# Patient Record
Sex: MALE | Employment: PART TIME | URBAN - METROPOLITAN AREA
[De-identification: names, ages, dates, MRNs, and addresses within clinical notes are randomized per-mention and may not be internally consistent; named-entity substitution may affect disease eponyms.]

---

## 2018-11-19 ENCOUNTER — APPOINTMENT (OUTPATIENT)
Dept: GENERAL RADIOLOGY | Age: 23
End: 2018-11-19
Payer: MEDICARE

## 2018-11-19 ENCOUNTER — HOSPITAL ENCOUNTER (EMERGENCY)
Age: 23
Discharge: HOME | End: 2018-11-19
Attending: EMERGENCY MEDICINE
Payer: MEDICARE

## 2018-11-19 VITALS
SYSTOLIC BLOOD PRESSURE: 125 MMHG | TEMPERATURE: 98 F | OXYGEN SATURATION: 100 % | HEIGHT: 75 IN | RESPIRATION RATE: 18 BRPM | DIASTOLIC BLOOD PRESSURE: 63 MMHG | BODY MASS INDEX: 21.76 KG/M2 | WEIGHT: 175 LBS | HEART RATE: 56 BPM

## 2018-11-19 DIAGNOSIS — S20.211A CONTUSION OF RIGHT CHEST WALL, INITIAL ENCOUNTER: Primary | ICD-10-CM

## 2018-11-19 PROCEDURE — 99283 EMERGENCY DEPT VISIT LOW MDM: CPT

## 2018-11-19 PROCEDURE — 71046 X-RAY EXAM CHEST 2 VIEWS: CPT

## 2018-11-19 RX ORDER — IBUPROFEN 600 MG/1
600 TABLET ORAL EVERY 6 HOURS PRN
Qty: 20 TABLET | Refills: 0 | Status: SHIPPED | OUTPATIENT
Start: 2018-11-19 | End: 2019-01-10

## 2018-11-19 ASSESSMENT — PAIN DESCRIPTION - LOCATION: LOCATION: ARM;SHOULDER

## 2018-11-19 ASSESSMENT — PAIN DESCRIPTION - ORIENTATION: ORIENTATION: RIGHT;UPPER

## 2018-11-19 ASSESSMENT — PAIN DESCRIPTION - DESCRIPTORS: DESCRIPTORS: ACHING;SHARP;SHOOTING

## 2018-11-19 ASSESSMENT — PAIN DESCRIPTION - PAIN TYPE: TYPE: ACUTE PAIN

## 2018-11-19 ASSESSMENT — PAIN SCALES - GENERAL: PAINLEVEL_OUTOF10: 7

## 2018-11-19 NOTE — ED PROVIDER NOTES
The patient was seen and examined by me in conjunction with the mid-level provider. I agree with his/her assessment and treatment plan. The patient has no tenderness at his right shoulder and it has full range of motion. He has tenderness in the right upper chest but x-rays negative per radiologist.  My clinical impression is that he has a contusion.      Damon Martinez MD  11/19/18 9599
4, 8 or more for level 5)     ED Triage Vitals [11/19/18 1407]   BP Temp Temp Source Pulse Resp SpO2 Height Weight   125/63 98 °F (36.7 °C) Oral 56 18 100 % 6' 3\" (1.905 m) 175 lb (79.4 kg)       Physical Exam   Constitutional: He is oriented to person, place, and time. He appears well-developed and well-nourished. No distress. HENT:   Head: Normocephalic and atraumatic. Eyes: Conjunctivae are normal. Right eye exhibits no discharge. Left eye exhibits no discharge. Neck: Normal range of motion. Neck supple. No spinous process tenderness and no muscular tenderness present. Cardiovascular: Normal rate and regular rhythm. Pulmonary/Chest: Effort normal and breath sounds normal. No respiratory distress. He exhibits tenderness. Musculoskeletal: Normal range of motion. He exhibits no edema, tenderness or deformity. Lymphadenopathy:     He has no cervical adenopathy. Neurological: He is oriented to person, place, and time. Skin: Skin is warm and dry. No erythema. DIAGNOSTIC RESULTS     EKG: All EKG's are interpreted by the Emergency Department Physician who either signs or Co-signs this chart in the absence of a cardiologist.    RADIOLOGY:   Non-plain film images such as CT, Ultrasound and MRI are read by the radiologist. Plain radiographic images are visualized and preliminarily interpreted by the emergency physician with the below findings:    Interpretation per the Radiologist below, if available at the time of this note:    XR CHEST STANDARD (2 VW)   Final Result   Unremarkable chest.                 LABS:  Labs Reviewed - No data to display    All other labs were within normal range or not returned as of this dictation.     EMERGENCY DEPARTMENT COURSE and DIFFERENTIAL DIAGNOSIS/MDM:   Vitals:    Vitals:    11/19/18 1407   BP: 125/63   Pulse: 56   Resp: 18   Temp: 98 °F (36.7 °C)   TempSrc: Oral   SpO2: 100%   Weight: 175 lb (79.4 kg)   Height: 6' 3\" (1.905 m)       Medical Decision

## 2019-01-10 ENCOUNTER — HOSPITAL ENCOUNTER (EMERGENCY)
Age: 24
Discharge: HOME | End: 2019-01-10
Attending: EMERGENCY MEDICINE
Payer: MEDICARE

## 2019-01-10 ENCOUNTER — APPOINTMENT (OUTPATIENT)
Dept: GENERAL RADIOLOGY | Age: 24
End: 2019-01-10
Payer: MEDICARE

## 2019-01-10 VITALS
TEMPERATURE: 98.2 F | WEIGHT: 187.39 LBS | BODY MASS INDEX: 23.3 KG/M2 | RESPIRATION RATE: 16 BRPM | DIASTOLIC BLOOD PRESSURE: 52 MMHG | HEIGHT: 75 IN | OXYGEN SATURATION: 100 % | HEART RATE: 67 BPM | SYSTOLIC BLOOD PRESSURE: 119 MMHG

## 2019-01-10 DIAGNOSIS — S82.892A CLOSED FRACTURE OF LEFT ANKLE, INITIAL ENCOUNTER: Primary | ICD-10-CM

## 2019-01-10 PROCEDURE — 73610 X-RAY EXAM OF ANKLE: CPT

## 2019-01-10 PROCEDURE — 99283 EMERGENCY DEPT VISIT LOW MDM: CPT

## 2019-01-10 PROCEDURE — 29515 APPLICATION SHORT LEG SPLINT: CPT

## 2019-01-10 PROCEDURE — 6370000000 HC RX 637 (ALT 250 FOR IP): Performed by: EMERGENCY MEDICINE

## 2019-01-10 RX ORDER — IBUPROFEN 600 MG/1
600 TABLET ORAL ONCE
Status: COMPLETED | OUTPATIENT
Start: 2019-01-10 | End: 2019-01-10

## 2019-01-10 RX ORDER — IBUPROFEN 400 MG/1
400 TABLET ORAL EVERY 8 HOURS PRN
Status: DISCONTINUED | OUTPATIENT
Start: 2019-01-10 | End: 2019-01-10 | Stop reason: HOSPADM

## 2019-01-10 RX ORDER — IBUPROFEN 600 MG/1
600 TABLET ORAL EVERY 6 HOURS PRN
Qty: 20 TABLET | Refills: 0 | Status: SHIPPED | OUTPATIENT
Start: 2019-01-10 | End: 2020-01-16

## 2019-01-10 RX ORDER — ACETAMINOPHEN 325 MG/1
650 TABLET ORAL ONCE
Status: COMPLETED | OUTPATIENT
Start: 2019-01-10 | End: 2019-01-10

## 2019-01-10 RX ADMIN — ACETAMINOPHEN 650 MG: 325 TABLET ORAL at 14:59

## 2019-01-10 RX ADMIN — IBUPROFEN 600 MG: 600 TABLET ORAL at 14:59

## 2019-01-10 ASSESSMENT — PAIN DESCRIPTION - ORIENTATION: ORIENTATION: LEFT

## 2019-01-10 ASSESSMENT — PAIN DESCRIPTION - DESCRIPTORS: DESCRIPTORS: ACHING;SHARP;STABBING

## 2019-01-10 ASSESSMENT — PAIN SCALES - GENERAL
PAINLEVEL_OUTOF10: 10
PAINLEVEL_OUTOF10: 9

## 2019-01-10 ASSESSMENT — PAIN DESCRIPTION - LOCATION: LOCATION: ANKLE

## 2019-01-22 DIAGNOSIS — M25.572 LEFT ANKLE PAIN, UNSPECIFIED CHRONICITY: Primary | ICD-10-CM

## 2019-01-23 ENCOUNTER — OFFICE VISIT (OUTPATIENT)
Dept: ORTHOPEDIC SURGERY | Age: 24
End: 2019-01-23
Payer: MEDICARE

## 2019-01-23 DIAGNOSIS — S99.912A INJURY OF LEFT ANKLE, INITIAL ENCOUNTER: Primary | ICD-10-CM

## 2019-01-23 PROCEDURE — G8484 FLU IMMUNIZE NO ADMIN: HCPCS | Performed by: STUDENT IN AN ORGANIZED HEALTH CARE EDUCATION/TRAINING PROGRAM

## 2019-01-23 PROCEDURE — 99203 OFFICE O/P NEW LOW 30 MIN: CPT | Performed by: STUDENT IN AN ORGANIZED HEALTH CARE EDUCATION/TRAINING PROGRAM

## 2019-01-23 PROCEDURE — G8420 CALC BMI NORM PARAMETERS: HCPCS | Performed by: STUDENT IN AN ORGANIZED HEALTH CARE EDUCATION/TRAINING PROGRAM

## 2019-01-23 PROCEDURE — 1036F TOBACCO NON-USER: CPT | Performed by: STUDENT IN AN ORGANIZED HEALTH CARE EDUCATION/TRAINING PROGRAM

## 2019-01-23 PROCEDURE — G8427 DOCREV CUR MEDS BY ELIG CLIN: HCPCS | Performed by: STUDENT IN AN ORGANIZED HEALTH CARE EDUCATION/TRAINING PROGRAM

## 2019-01-28 ENCOUNTER — HOSPITAL ENCOUNTER (OUTPATIENT)
Dept: PHYSICAL THERAPY | Facility: CLINIC | Age: 24
Setting detail: THERAPIES SERIES
Discharge: HOME | End: 2019-01-28
Payer: MEDICARE

## 2019-01-28 PROCEDURE — 97110 THERAPEUTIC EXERCISES: CPT

## 2019-01-28 PROCEDURE — 97161 PT EVAL LOW COMPLEX 20 MIN: CPT

## 2019-01-31 ENCOUNTER — HOSPITAL ENCOUNTER (OUTPATIENT)
Dept: PHYSICAL THERAPY | Facility: CLINIC | Age: 24
Setting detail: THERAPIES SERIES
Discharge: HOME | End: 2019-01-31
Payer: MEDICARE

## 2019-01-31 PROCEDURE — 97110 THERAPEUTIC EXERCISES: CPT

## 2019-02-04 ENCOUNTER — HOSPITAL ENCOUNTER (OUTPATIENT)
Dept: PHYSICAL THERAPY | Facility: CLINIC | Age: 24
Setting detail: THERAPIES SERIES
Discharge: HOME | End: 2019-02-04
Payer: MEDICARE

## 2019-02-04 PROCEDURE — 97110 THERAPEUTIC EXERCISES: CPT

## 2019-02-07 ENCOUNTER — HOSPITAL ENCOUNTER (OUTPATIENT)
Dept: PHYSICAL THERAPY | Facility: CLINIC | Age: 24
Setting detail: THERAPIES SERIES
Discharge: HOME | End: 2019-02-07
Payer: MEDICARE

## 2019-02-07 PROCEDURE — 97110 THERAPEUTIC EXERCISES: CPT

## 2019-02-27 ENCOUNTER — HOSPITAL ENCOUNTER (OUTPATIENT)
Dept: PHYSICAL THERAPY | Facility: CLINIC | Age: 24
Setting detail: THERAPIES SERIES
Discharge: HOME | End: 2019-02-27
Payer: MEDICARE

## 2019-02-27 PROCEDURE — 97110 THERAPEUTIC EXERCISES: CPT

## 2019-03-06 ENCOUNTER — OFFICE VISIT (OUTPATIENT)
Dept: ORTHOPEDIC SURGERY | Age: 24
End: 2019-03-06
Payer: MEDICARE

## 2019-03-06 VITALS — HEIGHT: 75 IN | WEIGHT: 187.39 LBS | BODY MASS INDEX: 23.3 KG/M2

## 2019-03-06 DIAGNOSIS — S99.912A INJURY OF LEFT ANKLE, INITIAL ENCOUNTER: Primary | ICD-10-CM

## 2019-03-06 PROCEDURE — G8420 CALC BMI NORM PARAMETERS: HCPCS | Performed by: STUDENT IN AN ORGANIZED HEALTH CARE EDUCATION/TRAINING PROGRAM

## 2019-03-06 PROCEDURE — 99213 OFFICE O/P EST LOW 20 MIN: CPT | Performed by: STUDENT IN AN ORGANIZED HEALTH CARE EDUCATION/TRAINING PROGRAM

## 2019-03-06 PROCEDURE — G8427 DOCREV CUR MEDS BY ELIG CLIN: HCPCS | Performed by: STUDENT IN AN ORGANIZED HEALTH CARE EDUCATION/TRAINING PROGRAM

## 2019-03-06 PROCEDURE — 1036F TOBACCO NON-USER: CPT | Performed by: STUDENT IN AN ORGANIZED HEALTH CARE EDUCATION/TRAINING PROGRAM

## 2019-03-06 PROCEDURE — G8484 FLU IMMUNIZE NO ADMIN: HCPCS | Performed by: STUDENT IN AN ORGANIZED HEALTH CARE EDUCATION/TRAINING PROGRAM

## 2019-03-07 ENCOUNTER — HOSPITAL ENCOUNTER (OUTPATIENT)
Dept: PHYSICAL THERAPY | Facility: CLINIC | Age: 24
Setting detail: THERAPIES SERIES
Discharge: HOME | End: 2019-03-07
Payer: MEDICARE

## 2019-03-07 PROCEDURE — 97110 THERAPEUTIC EXERCISES: CPT

## 2020-01-16 ENCOUNTER — HOSPITAL ENCOUNTER (EMERGENCY)
Age: 25
Discharge: HOME | End: 2020-01-16
Attending: EMERGENCY MEDICINE
Payer: MEDICARE

## 2020-01-16 ENCOUNTER — APPOINTMENT (OUTPATIENT)
Dept: GENERAL RADIOLOGY | Age: 25
End: 2020-01-16
Payer: MEDICARE

## 2020-01-16 VITALS
HEIGHT: 74 IN | SYSTOLIC BLOOD PRESSURE: 116 MMHG | DIASTOLIC BLOOD PRESSURE: 91 MMHG | TEMPERATURE: 98.2 F | OXYGEN SATURATION: 100 % | WEIGHT: 181 LBS | BODY MASS INDEX: 23.23 KG/M2 | RESPIRATION RATE: 14 BRPM | HEART RATE: 57 BPM

## 2020-01-16 PROCEDURE — 99283 EMERGENCY DEPT VISIT LOW MDM: CPT

## 2020-01-16 PROCEDURE — 73630 X-RAY EXAM OF FOOT: CPT

## 2020-01-16 ASSESSMENT — PAIN DESCRIPTION - PAIN TYPE: TYPE: ACUTE PAIN

## 2020-01-16 ASSESSMENT — ENCOUNTER SYMPTOMS
NAUSEA: 0
COLOR CHANGE: 0
SHORTNESS OF BREATH: 0
VOMITING: 0

## 2020-01-16 ASSESSMENT — PAIN SCALES - GENERAL
PAINLEVEL_OUTOF10: 5
PAINLEVEL_OUTOF10: 5

## 2020-01-16 ASSESSMENT — PAIN DESCRIPTION - ORIENTATION: ORIENTATION: RIGHT;UPPER

## 2020-01-16 ASSESSMENT — PAIN DESCRIPTION - LOCATION: LOCATION: FOOT

## 2020-01-16 ASSESSMENT — PAIN DESCRIPTION - DESCRIPTORS: DESCRIPTORS: SORE

## 2020-01-16 NOTE — ED NOTES
ASSESSMENT:   Presents to ED per self with c/o pain to dorsum lt foot. Proximal 1st metatarsal.  States couple weeks ago was playing soccer when another player jumped up and landed on his foot. Had cleats on. Pain and swelling still present. Taking motrin which helps.   Pain worse with walking     Conrad Xiong RN  01/16/20 6910

## 2020-01-16 NOTE — ED PROVIDER NOTES
[address] [place] [address] ED  eMERGENCY dEPARTMENT eNCOUnter  Resident    Pt Name: Aydee Man  MRN: [id number]  [place] 1995  Date of evaluation: 1/16/2020  PCP:  No primary care provider on file. CHIEF COMPLAINT       Chief Complaint   Patient presents with    Foot Injury     right foot, 3 weeks ago       HISTORY OF PRESENT ILLNESS    Aydee Man is a 22 y.o. male who presents with right foot pain. Notes that he was playing soccer 2 weeks ago where another player stepped on his foot. Notes initially pain was much worsened today. Swelling in his right foot had improved since initial injury. Notes that he has been applying ice and taking Motrin for pain which is helped. Patient is still experiencing pain and some swelling to his right foot which prompted ED visit. REVIEW OF SYSTEMS       Review of Systems   Constitutional: Negative for chills and fever. Respiratory: Negative for shortness of breath. Cardiovascular: Negative for chest pain and leg swelling. Gastrointestinal: Negative for nausea and vomiting. Musculoskeletal: Positive for arthralgias, gait problem, joint swelling and myalgias. Skin: Negative for color change and wound. Neurological: Negative for weakness and numbness. Psychiatric/Behavioral: Negative for behavioral problems. PAST MEDICAL HISTORY    has no past medical history on file. SURGICAL HISTORY      has no past surgical history on file. CURRENT MEDICATIONS       Previous Medications    IBUPROFEN (ADVIL;MOTRIN) 600 MG TABLET    Take 1 tablet by mouth every 6 hours as needed for Pain       ALLERGIES     has No Known Allergies. FAMILY HISTORY     has no family status information on file. family history is not on file. SOCIAL HISTORY      reports that he has never smoked. He has never used smokeless tobacco. He reports that he does not drink alcohol or use drugs.     PHYSICAL EXAM     INITIAL VITALS:  height is 6' 2\" (1.88 m) and weight is 181 lb (82.1 kg). His oral temperature is 98.2 °F (36.8 °C). His blood pressure is 116/91 (abnormal) and his pulse is 57. His respiration is 14 and oxygen saturation is 100%. Physical Exam  Constitutional:       Appearance: Normal appearance. He is normal weight. HENT:      Head: Normocephalic and atraumatic. Neck:      Musculoskeletal: Normal range of motion and neck supple. Cardiovascular:      Rate and Rhythm: Regular rhythm. Bradycardia present. Pulses: Normal pulses. Heart sounds: Normal heart sounds. Pulmonary:      Effort: Pulmonary effort is normal.      Breath sounds: Normal breath sounds. No wheezing or rales. Abdominal:      General: Bowel sounds are normal.      Palpations: Abdomen is soft. Tenderness: There is no tenderness. There is no rebound. Musculoskeletal: Normal range of motion. General: Swelling and tenderness present. No deformity or signs of injury. Right lower leg: Edema present. Comments: Minimal TTP to first TMTJ dorsal right foot. Able to flex and extend digits. All muscle compartments soft compressible. No tenderness to palpation at Lisfranc complex right foot. No pain on calf squeeze bilaterally. Skin:     General: Skin is warm and dry. Capillary Refill: Capillary refill takes 2 to 3 seconds. Findings: No bruising, erythema or lesion. Comments: No fracture blisters, ecchymosis, open wounds noted. No erythema present. No tenting of the skin noted. Neurological:      Mental Status: He is alert and oriented to person, place, and time. Mental status is at baseline. Sensory: No sensory deficit.       Coordination: Coordination normal.         DIFFERENTIAL DIAGNOSIS/MDM:   Right foot bone contusion versus fracture    DIAGNOSTIC RESULTS     EKG: All EKG's are interpreted by the Emergency Department Physician who either signs or Co-signs this chart in the absence of a cardiologist.    None    RADIOLOGY:   I directly visualized the following  images and reviewed the radiologist interpretations:  XR FOOT RIGHT (MIN 3 VIEWS)   Final Result   Negative right foot with no acute osseous abnormality. ED BEDSIDE ULTRASOUND:   None    LABS:  Labs Reviewed - No data to display    EMERGENCY DEPARTMENT COURSE:   Vitals:    Vitals:    01/16/20 1014   BP: (!) 116/91   Pulse: 57   Resp: 14   Temp: 98.2 °F (36.8 °C)   TempSrc: Oral   SpO2: 100%   Weight: 181 lb (82.1 kg)   Height: 6' 2\" (1.88 m)     XR Right foot ordered. X-rays negative for any osseous abnormalities or dislocations. CRITICAL CARE:  None    CONSULTS:  None      PROCEDURES:  None      FINAL IMPRESSION      1. Contusion of right foot, initial encounter            DISPOSITION/PLAN   DISPOSITION Decision To Discharge 01/16/2020 10:52:14 AM      Ace wrap applied and surgical shoe dispensed right foot. Recommend rest ice and elevating right foot. Ibuprofen for pain. Information to obtain PCP given. Patient to follow-up with Dr. Elizabeth Spivey within 1 week for follow-up.     PATIENT REFERRED TO:  Iris Vinson [facility].  [phone]    In 1 week        DISCHARGE MEDICATIONS:  New Prescriptions    No medications on file       (Please note that portions of this note were completed with a voice recognition program.  Efforts were made to edit the dictations but occasionally words are mis-transcribed.)    Roberta Roque DPM   1/16/2020 at 11:09 AM       Roberta Roque DPM  01/16/20 1110

## 2021-07-01 ENCOUNTER — HOSPITAL ENCOUNTER (OUTPATIENT)
Age: 26
Setting detail: SPECIMEN
Discharge: HOME | End: 2021-07-01

## 2021-07-02 LAB
C. TRACHOMATIS DNA ,URINE: NEGATIVE
N. GONORRHOEAE DNA, URINE: NEGATIVE
SOURCE: NORMAL
SPECIMEN DESCRIPTION: NORMAL
TRICHOMONAS VAGINALI, MOLECULAR: NEGATIVE

## 2021-07-20 ENCOUNTER — HOSPITAL ENCOUNTER (EMERGENCY)
Age: 26
Discharge: HOME | End: 2021-07-20
Attending: EMERGENCY MEDICINE
Payer: MEDICARE

## 2021-07-20 VITALS
SYSTOLIC BLOOD PRESSURE: 110 MMHG | DIASTOLIC BLOOD PRESSURE: 57 MMHG | WEIGHT: 186.5 LBS | BODY MASS INDEX: 23.93 KG/M2 | RESPIRATION RATE: 16 BRPM | OXYGEN SATURATION: 99 % | TEMPERATURE: 98.3 F | HEIGHT: 74 IN | HEART RATE: 62 BPM

## 2021-07-20 DIAGNOSIS — Z20.2 EXPOSURE TO STD: Primary | ICD-10-CM

## 2021-07-20 LAB
BILIRUBIN URINE: NEGATIVE
COLOR: YELLOW
COMMENT UA: NORMAL
GLUCOSE URINE: NEGATIVE
KETONES, URINE: NEGATIVE
LEUKOCYTE ESTERASE, URINE: NEGATIVE
NITRITE, URINE: NEGATIVE
PH UA: 7.5 (ref 5–8)
PROTEIN UA: NEGATIVE
SPECIFIC GRAVITY UA: 1.01 (ref 1–1.03)
TURBIDITY: CLEAR
URINE HGB: NEGATIVE
UROBILINOGEN, URINE: NORMAL

## 2021-07-20 PROCEDURE — 6370000000 HC RX 637 (ALT 250 FOR IP): Performed by: EMERGENCY MEDICINE

## 2021-07-20 PROCEDURE — 6360000002 HC RX W HCPCS: Performed by: EMERGENCY MEDICINE

## 2021-07-20 PROCEDURE — 99283 EMERGENCY DEPT VISIT LOW MDM: CPT

## 2021-07-20 PROCEDURE — 87491 CHLMYD TRACH DNA AMP PROBE: CPT

## 2021-07-20 PROCEDURE — 96372 THER/PROPH/DIAG INJ SC/IM: CPT

## 2021-07-20 PROCEDURE — 87591 N.GONORRHOEAE DNA AMP PROB: CPT

## 2021-07-20 PROCEDURE — 81003 URINALYSIS AUTO W/O SCOPE: CPT

## 2021-07-20 RX ORDER — AZITHROMYCIN 250 MG/1
1000 TABLET, FILM COATED ORAL ONCE
Status: COMPLETED | OUTPATIENT
Start: 2021-07-20 | End: 2021-07-20

## 2021-07-20 RX ORDER — CEFTRIAXONE SODIUM 250 MG/1
250 INJECTION, POWDER, FOR SOLUTION INTRAMUSCULAR; INTRAVENOUS ONCE
Status: COMPLETED | OUTPATIENT
Start: 2021-07-20 | End: 2021-07-20

## 2021-07-20 RX ORDER — METRONIDAZOLE 500 MG/1
2000 TABLET ORAL ONCE
Status: COMPLETED | OUTPATIENT
Start: 2021-07-20 | End: 2021-07-20

## 2021-07-20 RX ADMIN — METRONIDAZOLE 2000 MG: 500 TABLET ORAL at 18:13

## 2021-07-20 RX ADMIN — AZITHROMYCIN MONOHYDRATE 1000 MG: 250 TABLET ORAL at 18:14

## 2021-07-20 RX ADMIN — CEFTRIAXONE SODIUM 250 MG: 250 INJECTION, POWDER, FOR SOLUTION INTRAMUSCULAR; INTRAVENOUS at 18:14

## 2021-07-20 NOTE — ED PROVIDER NOTES
EMERGENCY DEPARTMENT ENCOUNTER    Pt Name: Nisa Murray  MRN: [id number]  [place] 1995  Date of evaluation: 7/20/21  CHIEF COMPLAINT       Chief Complaint   Patient presents with    Exposure to STD     partner is having discharge     HISTORY OF PRESENT ILLNESS   This is a 60-year-old male that presents with complaints of exposure to an STD. Patient denies any symptoms, he denies any discharge, has no dysuria hematuria testicular pain or swelling. He states that his partner was diagnosed with trichomonas. REVIEW OF SYSTEMS     Review of Systems   Genitourinary: Negative for discharge, penile pain, scrotal swelling and testicular pain. All other systems reviewed and are negative. PASTMEDICAL HISTORY   History reviewed. No pertinent past medical history. Past Problem List  There is no problem list on file for this patient. SURGICAL HISTORY     History reviewed. No pertinent surgical history. CURRENT MEDICATIONS       Current Discharge Medication List      CONTINUE these medications which have NOT CHANGED    Details   ibuprofen (ADVIL;MOTRIN) 600 MG tablet Take 1 tablet by mouth every 6 hours as needed for Pain  Qty: 20 tablet, Refills: 0           ALLERGIES     has No Known Allergies. FAMILY HISTORY     has no family status information on file. SOCIAL HISTORY       Social History     Tobacco Use    Smoking status: Never Smoker    Smokeless tobacco: Never Used   Substance Use Topics    Alcohol use: No    Drug use: No     PHYSICAL EXAM     INITIAL VITALS: BP (!) 110/57   Pulse 62   Temp 98.3 °F (36.8 °C) (Oral)   Resp 16   Ht 6' 2\" (1.88 m)   Wt 186 lb 8 oz (84.6 kg)   SpO2 99%   BMI 23.95 kg/m²    Physical Exam  Constitutional:       Appearance: Normal appearance. HENT:      Head: Normocephalic and atraumatic. Eyes:      Extraocular Movements: Extraocular movements intact. Pupils: Pupils are equal, round, and reactive to light.    Cardiovascular:      Rate and Rhythm: Normal rate and regular rhythm. Pulmonary:      Effort: Pulmonary effort is normal.      Breath sounds: Normal breath sounds. Abdominal:      General: Abdomen is flat. Palpations: Abdomen is soft. Tenderness: There is no abdominal tenderness. Neurological:      Mental Status: He is alert. MEDICAL DECISION MAKIN-year-old male, exposure to trichomonas, presumptive treatment for STDs and outpatient follow-up. CRITICAL CARE:       PROCEDURES:    Procedures    DIAGNOSTIC RESULTS   EKG:All EKG's are interpreted by the Emergency Department Physician who either signs or Co-signs this chart in the absence of a cardiologist.        RADIOLOGY:All plain film, CT, MRI, and formal ultrasound images (except ED bedside ultrasound) are read by the radiologist, see reports below, unless otherwisenoted in MDM or here. No orders to display     LABS: All lab results were reviewed by myself, and all abnormals are listed below. Labs Reviewed   C.TRACHOMATIS N.GONORRHOEAE DNA, URINE   URINALYSIS       EMERGENCY DEPARTMENTCOURSE:         Vitals:    Vitals:    21 1736   BP: (!) 110/57   Pulse: 62   Resp: 16   Temp: 98.3 °F (36.8 °C)   TempSrc: Oral   SpO2: 99%   Weight: 186 lb 8 oz (84.6 kg)   Height: 6' 2\" (1.88 m)       The patient was given the following medications while in the emergency department:  Orders Placed This Encounter   Medications    cefTRIAXone (ROCEPHIN) injection 250 mg     Order Specific Question:   Antimicrobial Indications     Answer:   STD infection    azithromycin (ZITHROMAX) tablet 1,000 mg     Order Specific Question:   Antimicrobial Indications     Answer:   STD infection    metroNIDAZOLE (FLAGYL) tablet 2,000 mg     Order Specific Question:   Antimicrobial Indications     Answer:   STD infection     CONSULTS:  None    FINAL IMPRESSION      1.  Exposure to STD          DISPOSITION/PLAN   DISPOSITION Decision To Discharge 2021 06:06:27 PM      PATIENT REFERRED TO:  [place] [id number] [phone]  Schedule an appointment as soon as possible for a visit in 2 days      DISCHARGE MEDICATIONS:  Current Discharge Medication List        Kaya Forde MD  Attending Emergency Physician                   Kaya Forde MD  07/20/21 0170

## 2021-07-21 LAB
C. TRACHOMATIS DNA ,URINE: NEGATIVE
N. GONORRHOEAE DNA, URINE: NEGATIVE
SPECIMEN DESCRIPTION: NORMAL

## 2021-07-22 ENCOUNTER — HOSPITAL ENCOUNTER (EMERGENCY)
Age: 26
Discharge: HOME | End: 2021-07-22
Attending: EMERGENCY MEDICINE
Payer: MEDICARE

## 2021-07-22 VITALS
HEIGHT: 74 IN | TEMPERATURE: 98.2 F | OXYGEN SATURATION: 100 % | BODY MASS INDEX: 24 KG/M2 | HEART RATE: 55 BPM | WEIGHT: 187 LBS | SYSTOLIC BLOOD PRESSURE: 107 MMHG | RESPIRATION RATE: 16 BRPM | DIASTOLIC BLOOD PRESSURE: 43 MMHG

## 2021-07-22 DIAGNOSIS — Z76.89 ENCOUNTER FOR ASSESSMENT OF STD EXPOSURE: Primary | ICD-10-CM

## 2021-07-22 LAB
-: NORMAL
AMORPHOUS: NORMAL
BACTERIA: NORMAL
BILIRUBIN URINE: NEGATIVE
CASTS UA: NORMAL /LPF
COLOR: YELLOW
COMMENT UA: NORMAL
CRYSTALS, UA: NORMAL /HPF
EPITHELIAL CELLS UA: NORMAL /HPF (ref 0–5)
GLUCOSE URINE: NEGATIVE
KETONES, URINE: NEGATIVE
LEUKOCYTE ESTERASE, URINE: NEGATIVE
MUCUS: NORMAL
NITRITE, URINE: NEGATIVE
OTHER OBSERVATIONS UA: NORMAL
PH UA: 6 (ref 5–8)
PROTEIN UA: NEGATIVE
RBC UA: NORMAL /HPF (ref 0–2)
RENAL EPITHELIAL, UA: NORMAL /HPF
SPECIFIC GRAVITY UA: 1.02 (ref 1–1.03)
TRICHOMONAS: NORMAL
TURBIDITY: CLEAR
URINE HGB: NEGATIVE
UROBILINOGEN, URINE: NORMAL
WBC UA: NORMAL /HPF (ref 0–5)
YEAST: NORMAL

## 2021-07-22 PROCEDURE — 99282 EMERGENCY DEPT VISIT SF MDM: CPT

## 2021-07-22 PROCEDURE — 87210 SMEAR WET MOUNT SALINE/INK: CPT

## 2021-07-22 PROCEDURE — 81001 URINALYSIS AUTO W/SCOPE: CPT

## 2021-07-22 PROCEDURE — 87491 CHLMYD TRACH DNA AMP PROBE: CPT

## 2021-07-22 PROCEDURE — 87591 N.GONORRHOEAE DNA AMP PROB: CPT

## 2021-07-23 LAB
DIRECT EXAM: NORMAL
Lab: NORMAL
SPECIMEN DESCRIPTION: NORMAL

## 2021-07-23 ASSESSMENT — ENCOUNTER SYMPTOMS: ABDOMINAL PAIN: 0

## 2021-07-23 NOTE — ED PROVIDER NOTES
[address] [facility] [place]  Emergency Medicine Department    Pt Name: Jose Curtis  MRN: [id number]  [place] 1995  Date of evaluation: 7/22/2021  Provider: Demetrio Anders MD    CHIEF COMPLAINT     Chief Complaint   Patient presents with    Exposure to STD     Was seen yesterday, states he was not tested for trich and would like to be       HISTORY OF PRESENT ILLNESS  (Location/Symptom, Timing/Onset, Context/Setting,Quality, Duration, Modifying Factors, Severity.)   Jose Curtis is a 23 y.o. male who presents to the emergency department as he was told to come back due to his urine not being tested for trichomonas at his previous visit. He was seen here 2 days ago with concerns for STD. He had found out a sexual partner tested positive for trichomonas. When he called for the results, he was told that the urine was not tested for trichomonas though his gonorrhea and chlamydia were both negative. He has no complaints at this time. Nursing Notes were reviewed. ALLERGIES     Patient has no known allergies. CURRENT MEDICATIONS       Discharge Medication List as of 7/22/2021 10:06 PM      CONTINUE these medications which have NOT CHANGED    Details   ibuprofen (ADVIL;MOTRIN) 600 MG tablet Take 1 tablet by mouth every 6 hours as needed for Pain, Disp-20 tablet, R-0Print             PAST MEDICAL HISTORY   History reviewed. No pertinent past medical history. SURGICAL HISTORY     History reviewed. No pertinent surgical history. FAMILY HISTORY     History reviewed. No pertinent family history. No family status information on file. SOCIAL HISTORY      reports that he has never smoked. He has never used smokeless tobacco. He reports that he does not drink alcohol and does not use drugs. REVIEW OF SYSTEMS    (2-9 systems for level 4, 10 or more for level 5)     Review of Systems   Constitutional: Negative for chills and fever. Gastrointestinal: Negative for abdominal pain.    Genitourinary: Negative for discharge, dysuria and penile pain. All other systems reviewed and are negative. PHYSICAL EXAM    (up to 7 for level 4, 8 or more for level 5)     ED Triage Vitals   BP Temp Temp src Pulse Resp SpO2 Height Weight   07/22/21 2155 07/22/21 2153 -- 07/22/21 2153 07/22/21 2153 07/22/21 2153 07/22/21 2153 07/22/21 2153   (!) 107/43 98.2 °F (36.8 °C)  55 16 100 % 6' 2\" (1.88 m) 187 lb (84.8 kg)       Physical Exam  Vitals and nursing note reviewed. Constitutional:       General: He is not in acute distress. Appearance: Normal appearance. He is not ill-appearing. HENT:      Nose: Nose normal.   Eyes:      Extraocular Movements: Extraocular movements intact. Pulmonary:      Effort: Pulmonary effort is normal. No respiratory distress. Musculoskeletal:         General: No deformity or signs of injury. Normal range of motion. Cervical back: Normal range of motion and neck supple. Skin:     General: Skin is warm and dry. Neurological:      General: No focal deficit present. Mental Status: He is alert and oriented to person, place, and time. Psychiatric:         Mood and Affect: Mood normal.         Behavior: Behavior normal.         DIAGNOSTIC RESULTS     RADIOLOGY:   Non-plain film images such as CT, Ultrasound and MRI are read by theradiologist. Plain radiographic images are visualized and preliminarily interpreted by the emergency physician with the below findings:    None indicated    ED BEDSIDE ULTRASOUND:   Performed by ED Physician - none    LABS:  Labs Reviewed   C.TRACHOMATIS N.GONORRHOEAE DNA, URINE   URINALYSIS WITH MICROSCOPIC       All other labs were within normal range or not returned as of this dictation.     EMERGENCYDEPARTMENT COURSE and DIFFERENTIAL DIAGNOSIS/MDM:   Vitals:    Vitals:    07/22/21 2153 07/22/21 2155   BP:  (!) 107/43   Pulse: 55    Resp: 16    Temp: 98.2 °F (36.8 °C)    SpO2: 100%    Weight: 187 lb (84.8 kg)    Height: 6' 2\" (1.88 m) 21-year-old male presenting requesting his urine P tested for trichomonas. Will resend the urine and discharge. We will call him with results. CONSULTS:  None    PROCEDURES:  None indicated    FINAL IMPRESSION     1.  Encounter for assessment of STD exposure          DISPOSITION/PLAN   DISPOSITION Decision To Discharge 07/22/2021 10:06:29 PM    PATIENT REFERRED TO:   [facility] ED  [address] [facility]  [phone]  Go to   As needed    DISCHARGE MEDICATIONS:     Discharge Medication List as of 7/22/2021 10:06 PM        (Please note that portions of this note were completed with a voice recognition program.  Efforts were made to edit the dictations butoccasionally words are mis-transcribed.)    Vicenta Kay MD  Attending Emergency Physician          Vicenta Kay MD  07/23/21 0346

## 2021-11-20 ENCOUNTER — APPOINTMENT (OUTPATIENT)
Dept: CT IMAGING | Age: 26
End: 2021-11-20
Payer: MEDICARE

## 2021-11-20 ENCOUNTER — APPOINTMENT (OUTPATIENT)
Dept: GENERAL RADIOLOGY | Age: 26
End: 2021-11-20
Payer: MEDICARE

## 2021-11-20 ENCOUNTER — HOSPITAL ENCOUNTER (EMERGENCY)
Age: 26
Discharge: HOME | End: 2021-11-20
Attending: EMERGENCY MEDICINE
Payer: MEDICARE

## 2021-11-20 VITALS
OXYGEN SATURATION: 99 % | WEIGHT: 187 LBS | DIASTOLIC BLOOD PRESSURE: 50 MMHG | SYSTOLIC BLOOD PRESSURE: 129 MMHG | RESPIRATION RATE: 18 BRPM | HEART RATE: 50 BPM | BODY MASS INDEX: 24 KG/M2 | HEIGHT: 74 IN | TEMPERATURE: 98.9 F

## 2021-11-20 DIAGNOSIS — R10.31 RIGHT LOWER QUADRANT ABDOMINAL PAIN: Primary | ICD-10-CM

## 2021-11-20 LAB
ABSOLUTE EOS #: 0.23 K/UL (ref 0–0.44)
ABSOLUTE IMMATURE GRANULOCYTE: 0.01 K/UL (ref 0–0.3)
ABSOLUTE LYMPH #: 1.46 K/UL (ref 1.1–3.7)
ABSOLUTE MONO #: 0.3 K/UL (ref 0.1–1.2)
ALBUMIN SERPL-MCNC: 4.7 G/DL (ref 3.5–5.2)
ALBUMIN/GLOBULIN RATIO: ABNORMAL (ref 1–2.5)
ALP BLD-CCNC: 100 U/L (ref 40–129)
ALT SERPL-CCNC: 29 U/L (ref 5–41)
AMYLASE: 177 U/L (ref 28–100)
ANION GAP SERPL CALCULATED.3IONS-SCNC: 7 MMOL/L (ref 9–17)
AST SERPL-CCNC: 40 U/L
BASOPHILS # BLD: 1 % (ref 0–2)
BASOPHILS ABSOLUTE: 0.05 K/UL (ref 0–0.2)
BILIRUB SERPL-MCNC: 1.7 MG/DL (ref 0.3–1.2)
BUN BLDV-MCNC: 11 MG/DL (ref 6–20)
BUN/CREAT BLD: 12 (ref 9–20)
CALCIUM SERPL-MCNC: 9.6 MG/DL (ref 8.6–10.4)
CHLORIDE BLD-SCNC: 99 MMOL/L (ref 98–107)
CO2: 30 MMOL/L (ref 20–31)
CREAT SERPL-MCNC: 0.91 MG/DL (ref 0.7–1.2)
DIFFERENTIAL TYPE: ABNORMAL
EOSINOPHILS RELATIVE PERCENT: 5 % (ref 1–4)
GFR AFRICAN AMERICAN: >60 ML/MIN
GFR NON-AFRICAN AMERICAN: >60 ML/MIN
GFR SERPL CREATININE-BSD FRML MDRD: ABNORMAL ML/MIN/{1.73_M2}
GFR SERPL CREATININE-BSD FRML MDRD: ABNORMAL ML/MIN/{1.73_M2}
GLUCOSE BLD-MCNC: 87 MG/DL (ref 70–99)
HCT VFR BLD CALC: 41.4 % (ref 40.7–50.3)
HEMOGLOBIN: 13.6 G/DL (ref 13–17)
IMMATURE GRANULOCYTES: 0 %
LIPASE: 67 U/L (ref 13–60)
LYMPHOCYTES # BLD: 32 % (ref 24–43)
MCH RBC QN AUTO: 30.8 PG (ref 25.2–33.5)
MCHC RBC AUTO-ENTMCNC: 32.9 G/DL (ref 28.4–34.8)
MCV RBC AUTO: 93.9 FL (ref 82.6–102.9)
MONOCYTES # BLD: 7 % (ref 3–12)
NRBC AUTOMATED: 0 PER 100 WBC
PDW BLD-RTO: 11.2 % (ref 11.8–14.4)
PLATELET # BLD: 146 K/UL (ref 138–453)
PLATELET ESTIMATE: ABNORMAL
PMV BLD AUTO: 12 FL (ref 8.1–13.5)
POTASSIUM SERPL-SCNC: 4.4 MMOL/L (ref 3.7–5.3)
RBC # BLD: 4.41 M/UL (ref 4.21–5.77)
RBC # BLD: ABNORMAL 10*6/UL
SEG NEUTROPHILS: 55 % (ref 36–65)
SEGMENTED NEUTROPHILS ABSOLUTE COUNT: 2.53 K/UL (ref 1.5–8.1)
SODIUM BLD-SCNC: 136 MMOL/L (ref 135–144)
TOTAL PROTEIN: 7.7 G/DL (ref 6.4–8.3)
WBC # BLD: 4.6 K/UL (ref 3.5–11.3)
WBC # BLD: ABNORMAL 10*3/UL

## 2021-11-20 PROCEDURE — 6360000004 HC RX CONTRAST MEDICATION: Performed by: EMERGENCY MEDICINE

## 2021-11-20 PROCEDURE — 99282 EMERGENCY DEPT VISIT SF MDM: CPT

## 2021-11-20 PROCEDURE — 2580000003 HC RX 258: Performed by: EMERGENCY MEDICINE

## 2021-11-20 PROCEDURE — 71046 X-RAY EXAM CHEST 2 VIEWS: CPT

## 2021-11-20 PROCEDURE — 83690 ASSAY OF LIPASE: CPT

## 2021-11-20 PROCEDURE — 85025 COMPLETE CBC W/AUTO DIFF WBC: CPT

## 2021-11-20 PROCEDURE — 80053 COMPREHEN METABOLIC PANEL: CPT

## 2021-11-20 PROCEDURE — 82150 ASSAY OF AMYLASE: CPT

## 2021-11-20 PROCEDURE — 71260 CT THORAX DX C+: CPT

## 2021-11-20 RX ORDER — SODIUM CHLORIDE 0.9 % (FLUSH) 0.9 %
10 SYRINGE (ML) INJECTION PRN
Status: DISCONTINUED | OUTPATIENT
Start: 2021-11-20 | End: 2021-11-20 | Stop reason: HOSPADM

## 2021-11-20 RX ORDER — 0.9 % SODIUM CHLORIDE 0.9 %
80 INTRAVENOUS SOLUTION INTRAVENOUS ONCE
Status: COMPLETED | OUTPATIENT
Start: 2021-11-20 | End: 2021-11-20

## 2021-11-20 RX ADMIN — SODIUM CHLORIDE, PRESERVATIVE FREE 10 ML: 5 INJECTION INTRAVENOUS at 18:03

## 2021-11-20 RX ADMIN — SODIUM CHLORIDE 80 ML: 9 INJECTION, SOLUTION INTRAVENOUS at 18:03

## 2021-11-20 RX ADMIN — IOPAMIDOL 75 ML: 755 INJECTION, SOLUTION INTRAVENOUS at 18:03

## 2021-11-20 ASSESSMENT — ENCOUNTER SYMPTOMS
ABDOMINAL DISTENTION: 0
CHEST TIGHTNESS: 0
EYE DISCHARGE: 0
BACK PAIN: 0
ABDOMINAL PAIN: 1
EYE PAIN: 0
FACIAL SWELLING: 0
SHORTNESS OF BREATH: 0

## 2021-11-20 ASSESSMENT — PAIN SCALES - GENERAL: PAINLEVEL_OUTOF10: 10

## 2021-11-20 NOTE — ED PROVIDER NOTES
EMERGENCY DEPARTMENT ENCOUNTER    Pt Name: Eric Ventura  MRN: [id number]  [place] 1995  Date of evaluation: 11/20/21  CHIEF COMPLAINT       Chief Complaint   Patient presents with    Side Pain     States pain to the right side onset Tuesday     HISTORY OF PRESENT ILLNESS   HPI   Patient is a 72-year-old male who presented to the emergency department secondary to right side pain. Patient was playing soccer on Tuesday when he was hit in his right side he subsequently fell on the ground hitting his right side he did not hit his head or any loss of consciousness. Patient stated since then he has had increased pain in his right side. He denied hematuria denied hemoptysis. He rates the pain 10 out of 10 on the pain scale. Patient denied chest pain, shortness of breath, nausea, vomiting, fevers or chills. REVIEW OF SYSTEMS     Review of Systems   Constitutional: Negative for chills, diaphoresis and fever. HENT: Negative for congestion, ear pain and facial swelling. Eyes: Negative for pain, discharge and visual disturbance. Respiratory: Negative for chest tightness and shortness of breath. Cardiovascular: Negative for chest pain and palpitations. Gastrointestinal: Positive for abdominal pain. Negative for abdominal distention. Genitourinary: Negative for difficulty urinating and flank pain. Musculoskeletal: Negative for back pain. Skin: Negative for wound. Neurological: Negative for dizziness, light-headedness and headaches. PASTMEDICAL HISTORY   History reviewed. No pertinent past medical history. SURGICAL HISTORY     History reviewed. No pertinent surgical history. CURRENT MEDICATIONS       Previous Medications    IBUPROFEN (ADVIL;MOTRIN) 600 MG TABLET    Take 1 tablet by mouth every 6 hours as needed for Pain     ALLERGIES     has No Known Allergies. FAMILY HISTORY     has no family status information on file.       SOCIAL HISTORY       Social History     Tobacco Use    Smoking status: Never Smoker    Smokeless tobacco: Never Used   Substance Use Topics    Alcohol use: No    Drug use: No     PHYSICAL EXAM     INITIAL VITALS: BP (!) 129/50   Pulse 50   Temp 98.9 °F (37.2 °C)   Resp 18   Ht 6' 2\" (1.88 m)   Wt 187 lb (84.8 kg)   SpO2 99%   BMI 24.01 kg/m²    Physical Exam  Vitals and nursing note reviewed. Constitutional:       General: He is not in acute distress. Appearance: He is well-developed. He is not diaphoretic. HENT:      Head: Normocephalic and atraumatic. Eyes:      Pupils: Pupils are equal, round, and reactive to light. Cardiovascular:      Rate and Rhythm: Normal rate and regular rhythm. Pulmonary:      Effort: Pulmonary effort is normal.      Breath sounds: Normal breath sounds. Abdominal:      General: Bowel sounds are normal.      Palpations: Abdomen is soft. Tenderness: There is abdominal tenderness. Comments: Right lower quadrant tender to palpation. No abdominal ecchymoses or flank ecchymosis appreciated on the right. No crepitus. Musculoskeletal:         General: Normal range of motion. Cervical back: Normal range of motion and neck supple. Skin:     General: Skin is warm. Capillary Refill: Capillary refill takes less than 2 seconds. Neurological:      Mental Status: He is alert and oriented to person, place, and time. MEDICAL DECISION MAKING:   The patient is a is a 73-year-old male who presented to the emergency department secondary to abdominal trauma. Orders for CT abdomen pelvis with contrast as well as CT chest with contrast labs. No acute findings on imaging, slightly elevated amylase lipase. Patient was able to tolerate oral without difficulty. Offered analgesia for home he declined. Discharged home with outpatient follow-up and given parameters to return to the emergency department.          All patient's question's and concerns were answered prior to disposition and patient and/or family expressed understanding and agreement of treatment plan. CRITICAL CARE:              NIH STROKE SCALE:            PROCEDURES:    Procedures    DIAGNOSTIC RESULTS   EKG:All EKG's are interpreted by the Emergency Department Physician who either signs or Co-signs this chart in the absence of a cardiologist.        RADIOLOGY:All plain film, CT, MRI, and formal ultrasound images (except ED bedside ultrasound) are read by the radiologist, see reports below, unless otherwisenoted in MDM or here. CT CHEST ABDOMEN PELVIS W CONTRAST   Final Result   Unremarkable CT study. There are no acute traumatic findings within of the   chest, abdomen, or pelvis. XR CHEST (2 VW)   Final Result   No evidence of acute cardiopulmonary disease. LABS: All lab results were reviewed by myself, and all abnormals are listed below.   Labs Reviewed   CBC WITH AUTO DIFFERENTIAL - Abnormal; Notable for the following components:       Result Value    RDW 11.2 (*)     Eosinophils % 5 (*)     All other components within normal limits   COMPREHENSIVE METABOLIC PANEL W/ REFLEX TO MG FOR LOW K - Abnormal; Notable for the following components:    Anion Gap 7 (*)     AST 40 (*)     Total Bilirubin 1.70 (*)     All other components within normal limits   LIPASE - Abnormal; Notable for the following components:    Lipase 67 (*)     All other components within normal limits   AMYLASE - Abnormal; Notable for the following components:    Amylase 177 (*)     All other components within normal limits   URINALYSIS WITH MICROSCOPIC       EMERGENCY DEPARTMENTCOURSE:         Vitals:    Vitals:    11/20/21 1624   BP: (!) 129/50   Pulse: 50   Resp: 18   Temp: 98.9 °F (37.2 °C)   SpO2: 99%   Weight: 187 lb (84.8 kg)   Height: 6' 2\" (1.88 m)       The patient was given the following medications while in the emergency department:  Orders Placed This Encounter   Medications    iopamidol (ISOVUE-370) 76 % injection 75 mL    sodium chloride flush 0.9 % injection 10 mL    0.9 % sodium chloride bolus     CONSULTS:  None    FINAL IMPRESSION      1. Right lower quadrant abdominal pain          DISPOSITION/PLAN   DISPOSITION Decision To Discharge 11/20/2021 07:03:22 PM      PATIENT REFERRED TO:    Please call [phone] to establish care with a primary care physician. DISCHARGE MEDICATIONS:  New Prescriptions    No medications on file     Lesa Cheadle, MD  Attending Emergency Physician      The care is provided during an unprecedented national emergency due to the novel coronavirus, COVID 19. This note was created with the assistance of a speech-recognition program. While intending to generate a document that actually reflects the content of the visit, no guarantees can be provided that every mistake has been identified and corrected by editing.     Jameson Byrne MD  17/88/65 6246

## 2023-05-16 ENCOUNTER — HOSPITAL ENCOUNTER (OUTPATIENT)
Age: 28
Setting detail: SPECIMEN
Discharge: HOME | End: 2023-05-16

## 2023-05-16 ENCOUNTER — OFFICE VISIT (OUTPATIENT)
Dept: FAMILY MEDICINE CLINIC | Age: 28
End: 2023-05-16
Payer: MEDICAID

## 2023-05-16 ENCOUNTER — HOSPITAL ENCOUNTER (OUTPATIENT)
Age: 28
Discharge: HOME | End: 2023-05-16
Payer: MEDICAID

## 2023-05-16 VITALS
DIASTOLIC BLOOD PRESSURE: 72 MMHG | BODY MASS INDEX: 24.46 KG/M2 | RESPIRATION RATE: 12 BRPM | TEMPERATURE: 98.6 F | HEART RATE: 59 BPM | WEIGHT: 190.6 LBS | SYSTOLIC BLOOD PRESSURE: 108 MMHG | HEIGHT: 74 IN | OXYGEN SATURATION: 99 %

## 2023-05-16 DIAGNOSIS — R39.89 BLADDER PAIN: ICD-10-CM

## 2023-05-16 DIAGNOSIS — R39.89 BLADDER PAIN: Primary | ICD-10-CM

## 2023-05-16 DIAGNOSIS — R35.0 URINARY FREQUENCY: ICD-10-CM

## 2023-05-16 LAB
BASOPHILS # BLD: 0.04 K/UL (ref 0–0.2)
BASOPHILS NFR BLD: 1 % (ref 0–2)
BILIRUBIN, POC: NEGATIVE
BLOOD URINE, POC: NEGATIVE
CLARITY, POC: NORMAL
COLOR, POC: YELLOW
EOSINOPHIL # BLD: 0.21 K/UL (ref 0–0.44)
EOSINOPHILS RELATIVE PERCENT: 5 % (ref 1–4)
ERYTHROCYTE [DISTWIDTH] IN BLOOD BY AUTOMATED COUNT: 11.3 % (ref 11.8–14.4)
GLUCOSE URINE, POC: NEGATIVE
HCT VFR BLD AUTO: 43.4 % (ref 40.7–50.3)
HGB BLD-MCNC: 14.5 G/DL (ref 13–17)
IMM GRANULOCYTES # BLD AUTO: <0.03 K/UL (ref 0–0.3)
IMM GRANULOCYTES NFR BLD: 0 %
KETONES, POC: NEGATIVE
LEUKOCYTE EST, POC: NEGATIVE
LYMPHOCYTES # BLD: 32 % (ref 24–43)
LYMPHOCYTES NFR BLD: 1.26 K/UL (ref 1.1–3.7)
MCH RBC QN AUTO: 31.7 PG (ref 25.2–33.5)
MCHC RBC AUTO-ENTMCNC: 33.4 G/DL (ref 28.4–34.8)
MCV RBC AUTO: 95 FL (ref 82.6–102.9)
MONOCYTES NFR BLD: 0.32 K/UL (ref 0.1–1.2)
MONOCYTES NFR BLD: 8 % (ref 3–12)
NEUTROPHILS NFR BLD: 54 % (ref 36–65)
NEUTS SEG NFR BLD: 2.17 K/UL (ref 1.5–8.1)
NITRITE, POC: NEGATIVE
NRBC AUTOMATED: 0 PER 100 WBC
PH, POC: 7.5
PLATELET # BLD AUTO: ABNORMAL K/UL (ref 138–453)
PLATELET, FLUORESCENCE: 138 K/UL (ref 138–453)
PLATELETS.RETICULATED NFR BLD AUTO: 9.3 % (ref 1.1–10.3)
PROTEIN, POC: NEGATIVE
RBC # BLD AUTO: 4.57 M/UL (ref 4.21–5.77)
SPECIFIC GRAVITY, POC: 1.02
UROBILINOGEN, POC: 0.2
WBC OTHER # BLD: 4 K/UL (ref 3.5–11.3)

## 2023-05-16 PROCEDURE — 85025 COMPLETE CBC W/AUTO DIFF WBC: CPT

## 2023-05-16 PROCEDURE — 85055 RETICULATED PLATELET ASSAY: CPT

## 2023-05-16 PROCEDURE — 99214 OFFICE O/P EST MOD 30 MIN: CPT

## 2023-05-16 PROCEDURE — 80048 BASIC METABOLIC PNL TOTAL CA: CPT

## 2023-05-16 PROCEDURE — 81002 URINALYSIS NONAUTO W/O SCOPE: CPT

## 2023-05-16 PROCEDURE — 36415 COLL VENOUS BLD VENIPUNCTURE: CPT

## 2023-05-16 PROCEDURE — 87086 URINE CULTURE/COLONY COUNT: CPT

## 2023-05-16 SDOH — ECONOMIC STABILITY: HOUSING INSECURITY
IN THE LAST 12 MONTHS, WAS THERE A TIME WHEN YOU DID NOT HAVE A STEADY PLACE TO SLEEP OR SLEPT IN A SHELTER (INCLUDING NOW)?: PATIENT REFUSED

## 2023-05-16 SDOH — ECONOMIC STABILITY: FOOD INSECURITY: WITHIN THE PAST 12 MONTHS, THE FOOD YOU BOUGHT JUST DIDN'T LAST AND YOU DIDN'T HAVE MONEY TO GET MORE.: PATIENT DECLINED

## 2023-05-16 SDOH — ECONOMIC STABILITY: INCOME INSECURITY: HOW HARD IS IT FOR YOU TO PAY FOR THE VERY BASICS LIKE FOOD, HOUSING, MEDICAL CARE, AND HEATING?: PATIENT DECLINED

## 2023-05-16 SDOH — ECONOMIC STABILITY: FOOD INSECURITY: WITHIN THE PAST 12 MONTHS, YOU WORRIED THAT YOUR FOOD WOULD RUN OUT BEFORE YOU GOT MONEY TO BUY MORE.: PATIENT DECLINED

## 2023-05-16 ASSESSMENT — ENCOUNTER SYMPTOMS
RESPIRATORY NEGATIVE: 1
VOMITING: 0
NAUSEA: 0
ABDOMINAL PAIN: 0
BACK PAIN: 0

## 2023-05-16 ASSESSMENT — PATIENT HEALTH QUESTIONNAIRE - PHQ9: DEPRESSION UNABLE TO ASSESS: PT REFUSES

## 2023-05-16 NOTE — PATIENT INSTRUCTIONS
Have lab work and imaging completed, will call with results. Continue with supportive treatment. Push hydration and avoid bladder irritants. Use Tylenol or Motrin as needed for pain/discomfort. Follow-up if worsening or no improvement.

## 2023-05-16 NOTE — PROGRESS NOTES
[address] [facility] WALK-IN  [address] [facility] WALK-IN  [address]  [address]  [address] [address]  Dept: [phone]    Sage Enrique is a 33 y.o. male Established patient, who presents to the walk-in clinic today with conditions/complaints as noted below:    Chief Complaint   Patient presents with    Bladder Problem     Pain in bladder x3-4 days, aching pain, notices more at night, denies pain when urinating    Urinary Frequency     Increased frequency x3-4 days         HPI:     Patient is a 31-year-old male that presents today with concerns regarding bladder pain and urinary frequency. His symptoms started on Friday night and have been persistent. Notes constant aching of his bladder that seems worse at night and rates it 9/10. Denies history of UTIs, STDs, or urological issues. Describes isolated episode of night sweats and subjective fever on Friday that concerned him. He is sexually active in a monogamous relationship for the past two months. Denies partner with symptoms. He is physically active but denies notable groin injury. He hasn't taken any medications for his symptoms. Denies dysuria, hematuria, difficulty urinating, flank pain, abdominal pain, nausea, vomiting, penile discharge, or testicular pain. History reviewed. No pertinent past medical history. No current outpatient medications on file. No current facility-administered medications for this visit. No Known Allergies    :     Review of Systems   Constitutional:  Positive for chills (\"night sweats\") and fever (subjective). Respiratory: Negative. Cardiovascular: Negative. Gastrointestinal:  Negative for abdominal pain, nausea and vomiting. Genitourinary:  Positive for frequency.  Negative for dysuria, flank pain, hematuria, penile discharge and

## 2023-05-17 LAB
ANION GAP SERPL CALCULATED.3IONS-SCNC: 9 MMOL/L (ref 9–17)
BUN SERPL-MCNC: 9 MG/DL (ref 6–20)
CALCIUM SERPL-MCNC: 9.5 MG/DL (ref 8.6–10.4)
CHLORIDE SERPL-SCNC: 102 MMOL/L (ref 98–107)
CO2 SERPL-SCNC: 28 MMOL/L (ref 20–31)
CREAT SERPL-MCNC: 0.88 MG/DL (ref 0.7–1.2)
GFR SERPL CREATININE-BSD FRML MDRD: >60 ML/MIN/1.73M2
GLUCOSE SERPL-MCNC: 85 MG/DL (ref 70–99)
MICROORGANISM SPEC CULT: NO GROWTH
POTASSIUM SERPL-SCNC: 4.8 MMOL/L (ref 3.7–5.3)
SODIUM SERPL-SCNC: 139 MMOL/L (ref 135–144)
SPECIMEN DESCRIPTION: NORMAL

## 2023-08-06 ENCOUNTER — HOSPITAL ENCOUNTER (EMERGENCY)
Facility: CLINIC | Age: 28
Discharge: HOME | End: 2023-08-06
Attending: EMERGENCY MEDICINE
Payer: MEDICAID

## 2023-08-06 VITALS
HEIGHT: 74 IN | SYSTOLIC BLOOD PRESSURE: 101 MMHG | DIASTOLIC BLOOD PRESSURE: 64 MMHG | BODY MASS INDEX: 22.84 KG/M2 | WEIGHT: 178 LBS | OXYGEN SATURATION: 99 % | HEART RATE: 63 BPM | TEMPERATURE: 98.5 F | RESPIRATION RATE: 16 BRPM

## 2023-08-06 DIAGNOSIS — H60.02 ABSCESS OF LEFT EARLOBE: Primary | ICD-10-CM

## 2023-08-06 PROCEDURE — 99283 EMERGENCY DEPT VISIT LOW MDM: CPT

## 2023-08-06 RX ORDER — DOXYCYCLINE HYCLATE 100 MG
100 TABLET ORAL 2 TIMES DAILY
Qty: 10 TABLET | Refills: 0 | Status: SHIPPED | OUTPATIENT
Start: 2023-08-06 | End: 2023-08-11

## 2023-08-06 ASSESSMENT — PAIN - FUNCTIONAL ASSESSMENT: PAIN_FUNCTIONAL_ASSESSMENT: NONE - DENIES PAIN

## 2023-08-06 NOTE — ED PROVIDER NOTES
[facility]  [address]  Phone: [phone]        Pt Name: Marnette Bumpers  MRN: [id number]  [address] [facility] 1995  Date of evaluation: 8/6/23    [address]       Chief Complaint   Patient presents with    Ear Problem     Pt had ears pieced 2 months ago, now small abscess to piercing on left ear       HISTORY OF PRESENT ILLNESS (Location/Symptom, Timing/Onset, Context/Setting, Quality, Duration, Modifying Factors, Severity)      Marnette Bumpers is a 33 y.o. male with no pertinent PMH who presents to the ED via private auto with complaints of small cyst on the anterior and posterior aspect of his left earlobe. He states that he pierced his ear several months ago and has since noticed the formation of 2 small cysts that have drained purulent fluid over the last several days. He is now experiencing pain. No other complaints today. No medical history. PAST MEDICAL / SURGICAL / SOCIAL / FAMILY HISTORY     PMH:  has no past medical history on file. Surgical History:  has no past surgical history on file. Social History:  reports that he has never smoked. He has never used smokeless tobacco. He reports that he does not drink alcohol and does not use drugs. Family History: has no family status information on file. family history is not on file. Psychiatric History: None    Allergies: Patient has no known allergies. Home Medications:   Prior to Admission medications    Medication Sig Start Date End Date Taking? Authorizing Provider   doxycycline hyclate (VIBRA-TABS) 100 MG tablet Take 1 tablet by mouth 2 times daily for 5 days 8/6/23 8/11/23 Yes JOSE MARIA Frederick       REVIEW OF SYSTEMS  (2-9 systems for level 4, 10 ormore for level 5)      Review of Systems   Constitutional: Negative. HENT:  Positive for ear discharge and ear pain. Eyes: Negative. Respiratory: Negative. Cardiovascular: Negative. Gastrointestinal: Negative. Endocrine: Negative.

## 2023-08-06 NOTE — DISCHARGE INSTRUCTIONS
Take antibiotics as prescribed. Try to keep the left earlobe clean and dry free from any type of debris. Please return to the emergency center or contact primary care provider with any worsening symptoms such as increased pain swelling and drainage or you start running fevers. Use Tylenol for pain. New primary care provider options have been provided for you. Cristiano Mckeon!!!    From [facility] Emergency Department ([facility]) and [facility] Emergency Services    On behalf of the Emergency Department staff at [facility]), I would like to thank you for giving us the opportunity to address your health care needs and concerns. We hope that during your visit, our service was delivered in a professional and caring manner. Please keep [facility] Department ([facility]) in mind as we walk with you down the path to your own personal wellness. Please expect an automated text message or email from us so we can ask a few questions about your health and progress. Based on your answers, a clinician may call you back to offer help and instructions. Please understand that early in the process of an illness or injury, an emergency department workup can be falsely reassuring. If you notice any worsening, changing or persistent symptoms please call your family doctor or return to the ER immediately. Tell us how we did during your visit at [email or web address]   and let us know about your experience

## 2023-08-06 NOTE — ED PROVIDER NOTES
[id number] [facility]  eMERGENCY dEPARTMENT eNCOUnter   Independent Attestation     Pt Name: Danielle Long  MRN: [id number]  [address] [facility] 1995  Date of evaluation: 8/6/23       Danielle Long is a 33 y.o. male who presents with Ear Problem (Pt had ears pieced 2 months ago, now small abscess to piercing on left ear)        Based on the medical record, the care appears appropriate. I was personally available for consultation in the Emergency Department.     Maria G Damico DO  Attending Emergency  Physician                Maria G Damico DO  08/06/23 [id number]

## 2023-08-10 ASSESSMENT — ENCOUNTER SYMPTOMS
EYES NEGATIVE: 1
RESPIRATORY NEGATIVE: 1
GASTROINTESTINAL NEGATIVE: 1
ALLERGIC/IMMUNOLOGIC NEGATIVE: 1

## 2024-03-02 ENCOUNTER — HOSPITAL ENCOUNTER (EMERGENCY)
Age: 29
Discharge: HOME | End: 2024-03-02
Attending: EMERGENCY MEDICINE
Payer: MEDICAID

## 2024-03-02 VITALS
BODY MASS INDEX: 23.61 KG/M2 | RESPIRATION RATE: 14 BRPM | WEIGHT: 184 LBS | TEMPERATURE: 98 F | HEIGHT: 74 IN | OXYGEN SATURATION: 100 % | SYSTOLIC BLOOD PRESSURE: 117 MMHG | DIASTOLIC BLOOD PRESSURE: 60 MMHG | HEART RATE: 54 BPM

## 2024-03-02 DIAGNOSIS — N50.89 GENITAL LESION, MALE: Primary | ICD-10-CM

## 2024-03-02 DIAGNOSIS — Z11.3 SCREEN FOR STD (SEXUALLY TRANSMITTED DISEASE): ICD-10-CM

## 2024-03-02 LAB
BACTERIA URNS QL MICRO: ABNORMAL
BILIRUB UR QL STRIP: NEGATIVE
CLARITY UR: CLEAR
COLOR UR: YELLOW
EPI CELLS #/AREA URNS HPF: ABNORMAL /HPF (ref 0–5)
GLUCOSE UR STRIP-MCNC: NEGATIVE MG/DL
HGB UR QL STRIP.AUTO: NEGATIVE
KETONES UR STRIP-MCNC: NEGATIVE MG/DL
LEUKOCYTE ESTERASE UR QL STRIP: NEGATIVE
MUCOUS THREADS URNS QL MICRO: ABNORMAL
NITRITE UR QL STRIP: NEGATIVE
PH UR STRIP: 6 [PH] (ref 5–8)
PROT UR STRIP-MCNC: NEGATIVE MG/DL
RBC #/AREA URNS HPF: ABNORMAL /HPF (ref 0–2)
SP GR UR STRIP: 1.02 (ref 1–1.03)
UROBILINOGEN UR STRIP-ACNC: NORMAL EU/DL (ref 0–1)
WBC #/AREA URNS HPF: ABNORMAL /HPF (ref 0–5)

## 2024-03-02 PROCEDURE — 87661 TRICHOMONAS VAGINALIS AMPLIF: CPT

## 2024-03-02 PROCEDURE — 81001 URINALYSIS AUTO W/SCOPE: CPT

## 2024-03-02 PROCEDURE — 99283 EMERGENCY DEPT VISIT LOW MDM: CPT

## 2024-03-02 PROCEDURE — 87529 HSV DNA AMP PROBE: CPT

## 2024-03-02 PROCEDURE — 87491 CHLMYD TRACH DNA AMP PROBE: CPT

## 2024-03-02 PROCEDURE — 87591 N.GONORRHOEAE DNA AMP PROB: CPT

## 2024-03-02 PROCEDURE — 87086 URINE CULTURE/COLONY COUNT: CPT

## 2024-03-02 ASSESSMENT — ENCOUNTER SYMPTOMS: SHORTNESS OF BREATH: 0

## 2024-03-02 ASSESSMENT — PAIN - FUNCTIONAL ASSESSMENT: PAIN_FUNCTIONAL_ASSESSMENT: 0-10

## 2024-03-02 ASSESSMENT — PAIN SCALES - GENERAL: PAINLEVEL_OUTOF10: 0

## 2024-03-02 NOTE — DISCHARGE INSTRUCTIONS
Call [phone] or the Located within [facility] clinic to establish care with primary care provider/doctor.    Follow-up on your HSV 1 and HSV 2, GC chlamydia results for treatment via My Chart or with primary care provider.    Take your medication as indicated and prescribed.  If you are given an antibiotic then, make sure you get the prescription filled and take the antibiotics until finished.  Drink plenty of water while taking the antibiotics.  Avoid drinking alcohol or drinks that have caffeine in it while taking antibiotics.       For pain use acetaminophen (Tylenol) or ibuprofen (Motrin / Advil), unless prescribed medications that have acetaminophen or ibuprofen (or similar medications) in it.  You can take over the counter acetaminophen tablets (1 - 2 tablets of the 500-mg strength every 6 hours) or ibuprofen tablets (2 tablets every 4 hours).    Do not have any sexual intercourse until the test results are completed in 2 - 3 days.   If your results come back positive for a STD then make sure that any of your sexual partners are treated before having intercourse with them.  The primary means of preventing STD transmission is with the use of condoms.    PLEASE RETURN TO THE EMERGENCY DEPARTMENT IMMEDIATELY for worsening symptoms, pain with urination, discharge from your penis, or if you develop any concerning symptoms such as: high fever not relieved by acetaminophen (Tylenol) and/or ibuprofen (Motrin / Advil), chills, shortness of breath, chest pain, feeling of your heart fluttering or racing, persistent nausea and/or vomiting, vomiting up blood, blood in your stool, loss of consciousness, numbness, weakness or tingling in the arms or legs or change in color of the extremities, changes in mental status, persistent headache, blurry vision loss of bladder / bowel control, unable to follow up with your physician, or other any other care or concern.

## 2024-03-02 NOTE — ED PROVIDER NOTES
eMERGENCY dEPARTMENT eNCOUnter   Independent Attestation     Pt Name: Elmer Hernandez  MRN: [id number]  Birthdate 1995  Date of evaluation: 3/2/24     Elmer Hernandez is a 28 y.o. male with CC: Skin Problem (Reports that he has genital lesions and would like to be checked for STIs)      Based on the medical record the care appears appropriate.  I was personally available for consultation in the Emergency Department.    Cameron Merino DO  Attending Emergency Physician                  Cameron Merino DO  03/02/24 9949

## 2024-03-02 NOTE — ED PROVIDER NOTES
[facility] ED  eMERGENCY dEPARTMENT eNCOUnter      Pt Name: Elmer Hernandez  MRN: [id number]  Birthdate 1995  Date of evaluation: 3/2/2024  Provider: BERNARD Rhodes CNP    CHIEF COMPLAINT       Chief Complaint   Patient presents with    Skin Problem     Reports that he has genital lesions and would like to be checked for STIs         HISTORY OF PRESENT ILLNESS  (Location/Symptom, Timing/Onset, Context/Setting, Quality, Duration, Modifying Factors, Severity.)   Elmer Hernandez is a 28 y.o. male who presents to the emergency department with complaint of a lesion in his genital that he noticed 1 to 2 weeks ago.  Patient states it is not painful and that it is healing, no bleeding or drainage.  Patient states that he was told by his friend whom he has been sexually active with that she was concerned for possible herpes outbreak.  Patient states he is never had HSV-1 or HSV-2.  No burning with urination, no scrotal or testicular pain.  No discharge or drainage from penis.  No blood in his urine.  No fever or chills, chest pain or shortness of breath.      Nursing Notes were reviewed.    ALLERGIES     Patient has no known allergies.    CURRENT MEDICATIONS       There are no discharge medications for this patient.      PAST MEDICAL HISTORY   No past medical history on file.    SURGICAL HISTORY     No past surgical history on file.      FAMILY HISTORY     No family history on file.  No family status information on file.        SOCIAL HISTORY      reports that he has never smoked. He has never used smokeless tobacco. He reports that he does not drink alcohol and does not use drugs.    REVIEW OF SYSTEMS    (2-9 systems for level 4, 10 or more for level 5)   Review of Systems   Constitutional:  Negative for chills and fever.   Respiratory:  Negative for shortness of breath.    Cardiovascular:  Negative for chest pain.   Genitourinary:  Positive for genital sores. Negative for decreased urine volume,  symptoms.     FINAL IMPRESSION      1. Genital lesion, male    2. Screen for STD (sexually transmitted disease)            Problem List  There is no problem list on file for this patient.        DISPOSITION/PLAN   DISPOSITION Decision To Discharge 03/02/2024 04:50:48 PM      PATIENT REFERRED TO:   [facility]  [facility]  [address] [address]  [phone]  Go to   New or worsening symptoms    call to establish care with primary care doctor  [phone]  Call       [facility] INTERNAL MEDICINE  [address] [place] [phone]  Schedule an appointment as soon as possible for a visit         DISCHARGE MEDICATIONS:     There are no discharge medications for this patient.          (Please note that portions of this note were completed with a voice recognition program.  Efforts were made to edit the dictations but occasionally words are mis-transcribed.)    BERNARD Rhodes CNP, Audrey, APRN - CNP  03/02/24 [id number]

## 2024-03-03 LAB
HSV1 DNA SPEC QL NAA+PROBE: NEGATIVE
HSV2 DNA SPEC QL NAA+PROBE: NEGATIVE
MICROORGANISM SPEC CULT: NO GROWTH
SOURCE: NORMAL
SPECIMEN DESCRIPTION: NORMAL
SPECIMEN DESCRIPTION: NORMAL
TRICHOMONAS VAGINALI, MOLECULAR: NEGATIVE

## 2024-03-04 LAB
CHLAMYDIA DNA UR QL NAA+PROBE: NEGATIVE
N GONORRHOEA DNA UR QL NAA+PROBE: NEGATIVE
SPECIMEN DESCRIPTION: NORMAL

## 2024-12-17 ENCOUNTER — HOSPITAL ENCOUNTER (EMERGENCY)
Facility: CLINIC | Age: 29
Discharge: HOME | End: 2024-12-17
Attending: EMERGENCY MEDICINE
Payer: MEDICAID

## 2024-12-17 VITALS
HEIGHT: 74 IN | DIASTOLIC BLOOD PRESSURE: 90 MMHG | BODY MASS INDEX: 24.38 KG/M2 | HEART RATE: 65 BPM | TEMPERATURE: 98.7 F | SYSTOLIC BLOOD PRESSURE: 113 MMHG | OXYGEN SATURATION: 100 % | RESPIRATION RATE: 18 BRPM | WEIGHT: 190 LBS

## 2024-12-17 DIAGNOSIS — K64.4 EXTERNAL HEMORRHOIDS: Primary | ICD-10-CM

## 2024-12-17 PROCEDURE — 99283 EMERGENCY DEPT VISIT LOW MDM: CPT

## 2024-12-17 RX ORDER — DOCUSATE SODIUM 100 MG/1
100 CAPSULE, LIQUID FILLED ORAL 2 TIMES DAILY
Qty: 20 CAPSULE | Refills: 0 | Status: SHIPPED | OUTPATIENT
Start: 2024-12-17

## 2024-12-17 ASSESSMENT — ENCOUNTER SYMPTOMS
RECTAL PAIN: 1
DIARRHEA: 1

## 2024-12-17 NOTE — DISCHARGE INSTRUCTIONS
For pain use acetaminophen (Tylenol) or ibuprofen (Motrin / Advil), unless prescribed medications that have acetaminophen or ibuprofen (or similar medications) in it.  You can take over the counter acetaminophen tablets (1 - 2 tablets of the 500-mg strength every 6 hours) or ibuprofen tablets (2 tablets every 4 hours).    After each bowel movement you can do a sitz bath, dry surrounding area and then apply the medication to the rectal region. Avoid bulk laxatives.  Use the stool softener or add bran and roughage to your diet.      PLEASE RETURN TO THE EMERGENCY DEPARTMENT IMMEDIATELY for worsening symptoms, or if you develop any concerning symptoms such as: high fever not relieved by acetaminophen (Tylenol) and/or ibuprofen (Motrin / Advil), chills, shortness of breath, chest pain, feeling of your heart fluttering or racing, persistent nausea and/or vomiting, vomiting up blood, blood in your stool, numbness, loss of consciousness, weakness or tingling in the arms or legs or change in color of the extremities, changes in mental status, persistent headache, blurry vision, loss of bladder / bowel control, unable to follow up with your physician, or other any other care or concern.

## 2024-12-17 NOTE — ED PROVIDER NOTES
[facility]  EMERGENCY DEPARTMENT ENCOUNTER      Pt Name: Elmer Hernandez  MRN: [id number]  Birthdate 1995  Date of evaluation: 12/17/2024  Provider: BERNARD Gruber NP  2:31 PM    CHIEF COMPLAINT       Chief Complaint   Patient presents with    Hemorrhoids         HISTORY OF PRESENT ILLNESS    Elmer Hernandez is a 29 y.o. male who presents to the emergency department     Patient reports that at he has a history of hemorrhoids and has 1 that has been extremely painful for the past 3 weeks.  Intermittent bleeding as well.  He has been dealing with this since he is 10.  Is been using Preparation H suppositories without any significant relief.  He has been using ice on it as well as continuing to lift weights and workout.  He has also been taking mag citrate causing watery diarrhea.  Patient denies any chest pain, shortness of breath, nausea, vomiting, constipation.  Denies any lower urinary tract symptoms.    The history is provided by the patient.       Nursing Notes were reviewed.    REVIEW OF SYSTEMS       Review of Systems   Gastrointestinal:  Positive for diarrhea and rectal pain.       Except as noted above the remainder of the review of systems was reviewed and negative.       PAST MEDICAL HISTORY   History reviewed. No pertinent past medical history.      SURGICAL HISTORY     History reviewed. No pertinent surgical history.      CURRENT MEDICATIONS       Previous Medications    No medications on file       ALLERGIES     Patient has no known allergies.    FAMILY HISTORY     History reviewed. No pertinent family history.       SOCIAL HISTORY       Social History     Socioeconomic History    Marital status: Single     Spouse name: None    Number of children: None    Years of education: None    Highest education level: None   Tobacco Use    Smoking status: Never    Smokeless tobacco: Never   Substance and Sexual Activity    Alcohol use: No    Drug use: No    Sexual activity: Yes

## 2024-12-17 NOTE — ED PROVIDER NOTES
[facility] DEPART[facility]  eMERGENCY dEPARTMENT eNCOUnter   Independent Attestation     Pt Name: Elmer Hernandez  MRN: [id number]  Birthdate 1995  Date of evaluation: 12/17/24       Elmer Hernandez is a 29 y.o. male who presents with Hemorrhoids        Based on the medical record, the care appears appropriate. I was personally available for consultation in the Emergency Department.    Zac Yuan DO  Attending Emergency  Physician                Zac Yuan DO  12/17/24 1410

## 2024-12-17 NOTE — ED NOTES
Pt presents to ED c/o hemorrhoids. Pt states he has been dealing with hemorrhoids since he was a child but states they have become extremely painful in over the past few weeks. Pt denies bleeding or other symptoms. Pt states he has tried OTC medications including suppositories with no relief. Pt arrives A/Ox4, afebrile, PWD, PMS intact. Pt resting on stretcher with call light in reach.

## 2024-12-19 ENCOUNTER — OFFICE VISIT (OUTPATIENT)
Dept: FAMILY MEDICINE CLINIC | Age: 29
End: 2024-12-19
Payer: MEDICAID

## 2024-12-19 VITALS
WEIGHT: 192.6 LBS | SYSTOLIC BLOOD PRESSURE: 107 MMHG | DIASTOLIC BLOOD PRESSURE: 60 MMHG | HEART RATE: 63 BPM | HEIGHT: 74 IN | BODY MASS INDEX: 24.72 KG/M2

## 2024-12-19 DIAGNOSIS — Z13.9 SCREENING DUE: ICD-10-CM

## 2024-12-19 DIAGNOSIS — K59.00 CONSTIPATION, UNSPECIFIED CONSTIPATION TYPE: ICD-10-CM

## 2024-12-19 DIAGNOSIS — K64.4 EXTERNAL HEMORRHOID: Primary | ICD-10-CM

## 2024-12-19 PROCEDURE — 99203 OFFICE O/P NEW LOW 30 MIN: CPT

## 2024-12-19 RX ORDER — LIDOCAINE 50 MG/G
CREAM TOPICAL
COMMUNITY
Start: 2024-12-17 | End: 2024-12-19 | Stop reason: ALTCHOICE

## 2024-12-19 RX ORDER — HYDROCORTISONE 25 MG/G
CREAM TOPICAL
Qty: 28 G | Refills: 1 | Status: SHIPPED | OUTPATIENT
Start: 2024-12-19

## 2024-12-19 RX ORDER — POLYETHYLENE GLYCOL 3350 17 G/17G
17 POWDER, FOR SOLUTION ORAL DAILY
Qty: 510 G | Refills: 0 | Status: SHIPPED | OUTPATIENT
Start: 2024-12-19 | End: 2025-01-18

## 2024-12-19 SDOH — HEALTH STABILITY: PHYSICAL HEALTH: ON AVERAGE, HOW MANY MINUTES DO YOU ENGAGE IN EXERCISE AT THIS LEVEL?: 110 MIN

## 2024-12-19 SDOH — ECONOMIC STABILITY: FOOD INSECURITY: WITHIN THE PAST 12 MONTHS, YOU WORRIED THAT YOUR FOOD WOULD RUN OUT BEFORE YOU GOT MONEY TO BUY MORE.: NEVER TRUE

## 2024-12-19 SDOH — ECONOMIC STABILITY: FOOD INSECURITY: WITHIN THE PAST 12 MONTHS, THE FOOD YOU BOUGHT JUST DIDN'T LAST AND YOU DIDN'T HAVE MONEY TO GET MORE.: NEVER TRUE

## 2024-12-19 SDOH — ECONOMIC STABILITY: INCOME INSECURITY: HOW HARD IS IT FOR YOU TO PAY FOR THE VERY BASICS LIKE FOOD, HOUSING, MEDICAL CARE, AND HEATING?: NOT HARD AT ALL

## 2024-12-19 SDOH — HEALTH STABILITY: PHYSICAL HEALTH: ON AVERAGE, HOW MANY DAYS PER WEEK DO YOU ENGAGE IN MODERATE TO STRENUOUS EXERCISE (LIKE A BRISK WALK)?: 5 DAYS

## 2024-12-19 ASSESSMENT — PATIENT HEALTH QUESTIONNAIRE - PHQ9
2. FEELING DOWN, DEPRESSED OR HOPELESS: NOT AT ALL
SUM OF ALL RESPONSES TO PHQ QUESTIONS 1-9: 0
1. LITTLE INTEREST OR PLEASURE IN DOING THINGS: NOT AT ALL
SUM OF ALL RESPONSES TO PHQ QUESTIONS 1-9: 0
SUM OF ALL RESPONSES TO PHQ9 QUESTIONS 1 & 2: 0

## 2024-12-19 NOTE — PATIENT INSTRUCTIONS
Thank you for letting us take care of you today. We hope all your questions were addressed. If a question was overlooked or something else comes to mind after you return home, please contact a member of your Care Team listed below.      Your Care Team at [facility] is Team #  Skyler Ring M.D. (Faculty)  Kem Diaz M.D. (Resident)  Fernando Sheehan M.D. (Resident)   Sayra Menard M.D. (Resident)  Will Zimmerman M.D. (Resident)  Iris Casas M.D. (Resident)  Dorothy Giordano., [facility]  Vandana Cooper, [facility]  Kelsy Hernadez, [facility]  Devante Sneed, [facility]  Morena Bennett, [facility]  Kandis Jane, [facility]  Benedicto Nguyen (LJ) TALON Batista (Clinical Practice Manager)  Marleny Carl [facility] (Clinical Pharmacist)     Office phone number: [phone]    If you need to get in right away due to illness, please be advised we have \"Same Day\" appointments available Monday-Friday. Please call us at [phone] option #3 to schedule your \"Same Day\" appointment.

## 2024-12-19 NOTE — PROGRESS NOTES
[facility] Residency Program - Outpatient Note      Subjective:    Elmer Hernandez is a 29 y.o. male with  has no past medical history on file.    Presented to the office today for:  Chief Complaint   Patient presents with    New Patient     Est. Care    Hemorrhoids    Sexually Transmitted Diseases     Asking for testing       HPI    29-year-old male new to me new to practice presented to establish care, patient has a recent visit to ER 2 days ago with concerns of hemorrhoids, patient complains of having hemorrhoids since he is 89-year-old, patient is complaining of constipation, does not have around regular bowel movements sometimes he can go 4 days without any bowel movement, and every time he tries to have a bowel movement it is painful for him and he avoids to void.  He does not have any bleeding with bowel movements for the last 2 weeks, but he did have some bleeding, he also mentions a purplish mass coming out of anal canal.  Patient does not complain of any foul-smelling discharge from anus, no weight loss.  Patient refused VINCENT    Patient does complain about being exposed to STI, especially herpes, he mentioned that he suspects that the female partner he is sexually active with has similar lesions, I did show him and explained to him what herpes lesions look like, patient has been tested in the past for herpes and it was negative, patient does not complain of any active lesions himself right now, no penile discharge, no fevers rigors chills, explained to him in detail that HSV testing is only warranted if you have active lesions.    Safe sex counseling also provided to the patient, regular use of condoms also advised.    Health care gap discussed with patient, patient agreeable to get HIV and hep C screening        The patient has a   Family History   Problem Relation Age of Onset    No Known Problems Mother     Early Death Father         Trauma to the head       Objective:   No

## 2025-01-03 NOTE — PROGRESS NOTES
ATTENDING NOTE    Attending Physician Statement  I have discussed the care of MuzeuBukasaincluding pertinent history and exam findings,  with the resident. I have reviewed the key elements of all parts of the encounter with the resident.  I agree with the assessment, plan and orders as documented by the resident.  (GE Modifier)    No past medical history on file.    Vitals:    12/19/24 1543   BP: 107/60   Pulse: 63       Elmre was seen today for new patient, hemorrhoids and sexually transmitted diseases.    Diagnoses and all orders for this visit:    External hemorrhoid  -     hydrocortisone (ANUSOL-HC) 2.5 % CREA rectal cream; Apply rectally for hemorrhoids  -     [facility] [place]    Constipation, unspecified constipation type  -     polyethylene glycol (GLYCOLAX) 17 GM/SCOOP powder; Take 17 g by mouth daily    Screening due  -     HIV Screen; Future  -     Hepatitis C Antibody; Future

## 2025-01-15 ENCOUNTER — OFFICE VISIT (OUTPATIENT)
Dept: SURGERY | Age: 30
End: 2025-01-15
Payer: MEDICAID

## 2025-01-15 VITALS
SYSTOLIC BLOOD PRESSURE: 110 MMHG | HEART RATE: 57 BPM | OXYGEN SATURATION: 99 % | HEIGHT: 74 IN | BODY MASS INDEX: 25.28 KG/M2 | WEIGHT: 197 LBS | DIASTOLIC BLOOD PRESSURE: 60 MMHG

## 2025-01-15 DIAGNOSIS — K64.4 EXTERNAL HEMORRHOID: Primary | ICD-10-CM

## 2025-01-15 PROCEDURE — 99203 OFFICE O/P NEW LOW 30 MIN: CPT

## 2025-01-15 RX ORDER — DOCUSATE SODIUM 100 MG/1
100 CAPSULE, LIQUID FILLED ORAL 2 TIMES DAILY
Qty: 60 CAPSULE | Refills: 0 | Status: SHIPPED | OUTPATIENT
Start: 2025-01-15 | End: 2025-02-14

## 2025-01-15 NOTE — PROGRESS NOTES
History and Physical  [facility]    Patient's Name/Date of Birth: Elmer Hernandez / 1995 (29 y.o.)    Date: January 15, 2025     HPI: Pt is a 29 y.o. male who presents to [facility] for evaluation of hemorrhoids.  Patient reports he has been struggling with these since he was about 10 years old.  He has been seen in the ED and by his primary care physician for these complaints.  He has tried Preparation H, sitz bath's, topical lidocaine without relief.  States he has been dealing with a lot of pain.  Pain exacerbated by bowel movements.      No past medical history on file.    No past surgical history on file.    Current Outpatient Medications   Medication Sig Dispense Refill    hydrocortisone (ANUSOL-HC) 2.5 % CREA rectal cream Apply rectally for hemorrhoids 28 g 1    polyethylene glycol (GLYCOLAX) 17 GM/SCOOP powder Take 17 g by mouth daily 510 g 0    docusate sodium (COLACE) 100 MG capsule Take 1 capsule by mouth 2 times daily 20 capsule 0    Lidocaine, Anorectal, 5 % GEL Apply to affected hemorrhoid as need 3 x day. 30 g 0     No current facility-administered medications for this visit.       No Known Allergies    Family History   Problem Relation Age of Onset    No Known Problems Mother     Early Death Father         Trauma to the head       Social History     Socioeconomic History    Marital status: Single     Spouse name: Not on file    Number of children: Not on file    Years of education: Not on file    Highest education level: Not on file   Occupational History    Not on file   Tobacco Use    Smoking status: Never    Smokeless tobacco: Never   Vaping Use    Vaping status: Never Used   Substance and Sexual Activity    Alcohol use: Yes     Comment: Occ.    Drug use: No    Sexual activity: Yes     Partners: Female   Other Topics Concern    Not on file   Social History Narrative    Not on file     Social Determinants of Health     Financial Resource Strain: Low Risk  (12/19/2024)

## 2025-01-15 NOTE — PATIENT INSTRUCTIONS
Thank you letting us take care of you today. We hope that all your questions were addressed. If a question was overlooked or something else comes to mind after you return home, please call our office at [phone].    If you need to cancel or change an appointment, surgery or procedure, please contact the office at [phone].

## 2025-01-15 NOTE — PROGRESS NOTES
Visit Information    Have you changed or started any medications since your last visit including any over-the-counter medicines, vitamins, or herbal medicines? no   Have you stopped taking any of your medications? Is so, why? -  no  Are you having any side effects from any of your medications? - no    Have you seen any other physician or provider since your last visit?  no   Have you had any other diagnostic tests since your last visit?  no   Have you been seen in the emergency room and/or had an admission in a hospital since we last saw you?  no   Have you had your routine dental cleaning in the past 6 months?  no     Do you have an active MyChart account? If no, what is the barrier?  Yes    Patient Care Team:  Fernando Sheehan MD as PCP - General (Family Medicine)  Chetan Coughlin IV, DO as Consulting Physician (General Surgery)    Medical History Review  Past Medical, Family, and Social History reviewed and does not contribute to the patient presenting condition    Health Maintenance   Topic Date Due    HIV screen  Never done    Hepatitis C screen  Never done    Hepatitis B vaccine (2 of 3 - 19+ 3-dose series) 12/13/2018    Flu vaccine (1) Never done    COVID-19 Vaccine (1 - 2023-24 season) Never done    Depression Screen  12/19/2025    DTaP/Tdap/Td vaccine (2 - Td or Tdap) 11/15/2028    Varicella vaccine  Completed    Hepatitis A vaccine  Aged Out    Hib vaccine  Aged Out    HPV vaccine  Aged Out    Polio vaccine  Aged Out    Meningococcal (ACWY) vaccine  Aged Out    Pneumococcal 0-64 years Vaccine  Aged Out

## 2025-01-20 ENCOUNTER — TELEPHONE (OUTPATIENT)
Dept: SURGERY | Age: 30
End: 2025-01-20

## 2025-01-20 NOTE — TELEPHONE ENCOUNTER
1st attempt to call patient to schedule consult with Dr. Agudelo. Phone is not accepting calls at this time.

## 2025-03-06 ENCOUNTER — OFFICE VISIT (OUTPATIENT)
Dept: SURGERY | Age: 30
End: 2025-03-06

## 2025-03-06 VITALS
WEIGHT: 194 LBS | HEIGHT: 74 IN | DIASTOLIC BLOOD PRESSURE: 47 MMHG | HEART RATE: 58 BPM | BODY MASS INDEX: 24.9 KG/M2 | SYSTOLIC BLOOD PRESSURE: 91 MMHG

## 2025-03-06 DIAGNOSIS — K64.4 ANAL SKIN TAG: ICD-10-CM

## 2025-03-06 DIAGNOSIS — K60.2 ANAL FISSURE: Primary | ICD-10-CM

## 2025-03-06 ASSESSMENT — ENCOUNTER SYMPTOMS
ANAL BLEEDING: 1
STRIDOR: 0
VOMITING: 0
RECTAL PAIN: 1
APNEA: 0
SHORTNESS OF BREATH: 0
CHEST TIGHTNESS: 0
COLOR CHANGE: 0
NAUSEA: 0
ABDOMINAL PAIN: 0
DIARRHEA: 0
COUGH: 0
CONSTIPATION: 0
ABDOMINAL DISTENTION: 0
BLOOD IN STOOL: 0
BACK PAIN: 0
WHEEZING: 0

## 2025-03-06 NOTE — PROGRESS NOTES
[facility] PHYSICIANS [facility] SURGICAL SPECIALISTS  [address] [id number]  Dept: [phone]    Patient:  Elmer Hernandez  YOB: 1995  Date: 3/6/2025     The patient is a 29 y.o. male who presents today for consult of the following problems:     Chief Complaint: New Patient (Hemorrhoids)       HPI:     Elmer Hernandez a 29 y.o. male is here for a new patient consult to discuss hemorrhoids. Patient reports he has been struggling with these since he was about 10 years old.  He has been seen in the ED and by his primary care physician for these complaints.  He has tried Preparation H, sitz bath's, topical lidocaine without relief.  States he has been dealing with a lot of pain.  Pain exacerbated by bowel movements..     Patient has tried preparation-h, sitz baths, topical lidocaine with no benefit.  He reports constant rectal bleeding, rectal pain, feeling lumps when wiping.   He denies constipation, diarrhea.   He describes BM as every other day, due to the pain with straining, consistency not very hard.       Last imaging:    CT - 11/20/2021      History:     No past medical history on file.  No past surgical history on file.  Family History   Problem Relation Age of Onset    No Known Problems Mother     Early Death Father         Trauma to the head     Social History     Tobacco Use    Smoking status: Never    Smokeless tobacco: Never   Vaping Use    Vaping status: Never Used   Substance Use Topics    Alcohol use: Yes     Comment: Occ.    Drug use: No     Current Outpatient Medications   Medication Sig Dispense Refill    hydrocortisone (ANUSOL-HC) 2.5 % CREA rectal cream Apply rectally for hemorrhoids 28 g 1    docusate sodium (COLACE) 100 MG capsule Take 1 capsule by mouth 2 times daily 20 capsule 0     No current facility-administered medications for this visit.      No Known Allergies    Review of Systems   Constitutional:  Negative

## 2025-03-07 ENCOUNTER — TELEPHONE (OUTPATIENT)
Dept: SURGERY | Age: 30
End: 2025-03-07

## 2025-03-07 NOTE — TELEPHONE ENCOUNTER
Patient called in asking where his sitz bath and metamucil where sent as he received his suppositories from [facility]. Patient informed Metamucil is over the counter and sent him a [employer] message with information on Metamucil and the sitz bath went to [facility] on [address], confirming the address. Patient had no other questions.

## 2025-03-20 ENCOUNTER — OFFICE VISIT (OUTPATIENT)
Dept: SURGERY | Age: 30
End: 2025-03-20

## 2025-03-20 VITALS
SYSTOLIC BLOOD PRESSURE: 103 MMHG | DIASTOLIC BLOOD PRESSURE: 59 MMHG | HEART RATE: 59 BPM | WEIGHT: 195 LBS | HEIGHT: 74 IN | BODY MASS INDEX: 25.03 KG/M2

## 2025-03-20 DIAGNOSIS — K60.2 ANAL FISSURE: Primary | ICD-10-CM

## 2025-03-20 DIAGNOSIS — K64.4 ANAL SKIN TAG: ICD-10-CM

## 2025-03-20 ASSESSMENT — ENCOUNTER SYMPTOMS
RECTAL PAIN: 1
ABDOMINAL DISTENTION: 0
BACK PAIN: 0
CONSTIPATION: 0
BLOOD IN STOOL: 0
WHEEZING: 0
STRIDOR: 0
ANAL BLEEDING: 0
VOMITING: 0
COLOR CHANGE: 0
CHEST TIGHTNESS: 0
SHORTNESS OF BREATH: 0
ABDOMINAL PAIN: 0
COUGH: 0
APNEA: 0
NAUSEA: 0
DIARRHEA: 0

## 2025-03-20 NOTE — PROGRESS NOTES
[facility] PHYSICIANS [facility] SURGICAL SPECIALISTS  [id number] [id number]  Dept: [phone]    Patient:  Elmer Hernandez  YOB: 1995  Date: 3/20/2025     The patient is a 29 y.o. male who presents today for consult of the following problems:     Chief Complaint: Follow-up (Anal fissure/skin tag)       HPI:     Elmer Hernandez a 29 y.o. male is here to discuss anal fissure/skin tag. Plan from last visit was nifedipine suppositories, metamucil, and sitz bath; he reports this has been doing good, the pain subsided greatly.      He  reports small pain after bowel movements, .   He denies swelling, itching, constipation, diarrhea, cramping, .   He describes bowel movements as 1x daily without straining.    Last imaging:    CT - 11/20/2021  History:     No past medical history on file.  No past surgical history on file.  Family History   Problem Relation Age of Onset    No Known Problems Mother     Early Death Father         Trauma to the head     Social History     Tobacco Use    Smoking status: Never    Smokeless tobacco: Never   Vaping Use    Vaping status: Never Used   Substance Use Topics    Alcohol use: Yes     Comment: Occ.    Drug use: No     Current Outpatient Medications   Medication Sig Dispense Refill    Misc. Devices (SITZ BATH) MISC 1 Units by Does not apply route 3 times daily 1 each 0    hydrocortisone (ANUSOL-HC) 2.5 % CREA rectal cream Apply rectally for hemorrhoids 28 g 1    docusate sodium (COLACE) 100 MG capsule Take 1 capsule by mouth 2 times daily 20 capsule 0     No current facility-administered medications for this visit.      No Known Allergies    Review of Systems   Constitutional:  Negative for activity change, appetite change, chills, diaphoresis, fatigue, fever and unexpected weight change.   Respiratory:  Negative for apnea, cough, chest tightness, shortness of breath, wheezing and stridor.    Cardiovascular: